# Patient Record
Sex: FEMALE | Race: WHITE | NOT HISPANIC OR LATINO | Employment: FULL TIME | URBAN - METROPOLITAN AREA
[De-identification: names, ages, dates, MRNs, and addresses within clinical notes are randomized per-mention and may not be internally consistent; named-entity substitution may affect disease eponyms.]

---

## 2017-07-28 ENCOUNTER — OFFICE VISIT (OUTPATIENT)
Dept: URGENT CARE | Facility: CLINIC | Age: 22
End: 2017-07-28
Payer: COMMERCIAL

## 2017-07-28 VITALS
SYSTOLIC BLOOD PRESSURE: 122 MMHG | DIASTOLIC BLOOD PRESSURE: 60 MMHG | RESPIRATION RATE: 16 BRPM | BODY MASS INDEX: 22.49 KG/M2 | WEIGHT: 135 LBS | OXYGEN SATURATION: 99 % | HEART RATE: 61 BPM | HEIGHT: 65 IN | TEMPERATURE: 98.9 F

## 2017-07-28 DIAGNOSIS — S61.412A LACERATION OF LEFT HAND, FOREIGN BODY PRESENCE UNSPECIFIED, INITIAL ENCOUNTER: ICD-10-CM

## 2017-07-28 PROCEDURE — 12001 RPR S/N/AX/GEN/TRNK 2.5CM/<: CPT | Performed by: PHYSICIAN ASSISTANT

## 2017-07-28 ASSESSMENT — ENCOUNTER SYMPTOMS
GASTROINTESTINAL NEGATIVE: 1
SENSORY CHANGE: 0
RESPIRATORY NEGATIVE: 1
PSYCHIATRIC NEGATIVE: 1
ROS SKIN COMMENTS: LACERATION
EYES NEGATIVE: 1
CARDIOVASCULAR NEGATIVE: 1
CONSTITUTIONAL NEGATIVE: 1
MUSCULOSKELETAL NEGATIVE: 1
TINGLING: 0

## 2017-07-28 NOTE — MR AVS SNAPSHOT
"        Suzi Gilmore   2017 5:30 PM   Office Visit   MRN: [id number]    Department:  [facility]   Dept Phone:  [phone]    Description:  Female : 1995   Provider:  Jacobo Pittman PA-C           Reason for Visit     Laceration on left hand x today      Allergies as of 2017     No Known Allergies      You were diagnosed with     Laceration of left hand, foreign body presence unspecified, initial encounter   [[id number]]         Vital Signs     Blood Pressure Pulse Temperature Respirations Height Weight    122/60 mmHg 61 37.2 °C (98.9 °F) 16 1.651 m (5' 5\") 61.236 kg (135 lb)    Body Mass Index Oxygen Saturation Breastfeeding? Smoking Status          22.47 kg/m2 99% No Never Smoker         Basic Information     Date Of Birth Sex Race Ethnicity Preferred Language    1995 Female White Non- English      Health Maintenance     Patient has no pending health maintenance at this time      Current Immunizations     No immunizations on file.      Below and/or attached are the medications your provider expects you to take. Review all of your home medications and newly ordered medications with your provider and/or pharmacist. Follow medication instructions as directed by your provider and/or pharmacist. Please keep your medication list with you and share with your provider. Update the information when medications are discontinued, doses are changed, or new medications (including over-the-counter products) are added; and carry medication information at all times in the event of emergency situations     Allergies:  No Known Allergies          Medications  Valid as of: 2017 -  6:40 PM    Generic Name Brand Name Tablet Size Instructions for use    .                 Medicines prescribed today were sent to:     [facility] [address] [place] [address] [place] - [address]    [address] [address]    Phone: [phone] Fax: [phone]    Open " 24 Hours?: Yes      Medication refill instructions:       If your prescription bottle indicates you have medication refills left, it is not necessary to call your provider’s office. Please contact your pharmacy and they will refill your medication.    If your prescription bottle indicates you do not have any refills left, you may request refills at any time through one of the following ways: The online AccessData system (except Urgent Care), by calling your provider’s office, or by asking your pharmacy to contact your provider’s office with a refill request. Medication refills are processed only during regular business hours and may not be available until the next business day. Your provider may request additional information or to have a follow-up visit with you prior to refilling your medication.   *Please Note: Medication refills are assigned a new Rx number when refilled electronically. Your pharmacy may indicate that no refills were authorized even though a new prescription for the same medication is available at the pharmacy. Please request the medicine by name with the pharmacy before contacting your provider for a refill.        Instructions    Wound Care Instructions    * After 24hrs leaving a wound uncovered helps it stay dry and heal.  If the wound isn't in an area that will get dirty or be rubbed by clothing you don't have to cover it.  If you do need to cover it do so with either an adhesive strip (band-aid) or sterile gauze and adhesive tape.  Change the dressing each day to keep the wound clean and dry.    * Antibiotic ointments such as Bacitracin or Neosporin may help keep the wound clean and help with scarring.  But DO NOT use these type of ointments with Dermabond as they interfere with wound healing.     * Keep wound dry for 5-7 days so the stitches have time to set .    * Return to the clinic in 8-10 days for stitch removal.    * You may be prescribed oral antibiotics if risk of infection is very  high (such as your wound occurred over 12hrs before closure or you are a diabetic)    * You should have a current tetanus vaccination within the past ten years.    * Call your doctor if any of the following things occur as they may be signs of infection...    **the wound opens    **the wound drains pus    **red streaks develop from near the wound    **you have a fever over 101    **the wound site becomes tender or inflamed                Adsvark Access Code: [email or web address]  Expires: 8/27/2017  6:24 PM    Your email address is not on file at [facility].  Email Addresses are required for you to sign up for Adsvark, please contact [phone] to verify your personal information and to provide your email address prior to attempting to register for Adsvark.    Suzi Gilmore  [address] [place] [address]    Adsvark  A secure, online tool to manage your health information     [facility]’s Adsvark® is a secure, online tool that connects you to your personalized health information from the privacy of your home -- day or night - making it very easy for you to manage your healthcare. Once the activation process is completed, you can even access your medical information using the Adsvark milly, which is available for free in the Apple Milly store or Google Play store.     To learn more about Adsvark, visit [email or web address]    There are two levels of access available (as shown below):   My Chart Features  [facility] Primary Care Doctor [facility]  Specialists [facility]  Urgent  Care Non-[facility] Primary Care Doctor   Email your healthcare team securely and privately 24/7 X X X    Manage appointments: schedule your next appointment; view details of past/upcoming appointments X      Request prescription refills. X      View recent personal medical records, including lab and immunizations X X X X   View health record, including health history, allergies, medications X X X X   Read reports about your outpatient  visits, procedures, consult and ER notes X X X X   See your discharge summary, which is a recap of your hospital and/or ER visit that includes your diagnosis, lab results, and care plan X X  X     How to register for Vino Volo:  Once your e-mail address has been verified, follow the following steps to sign up for Vino Volo.     1. Go to  [email or web address]  2. Click on the Sign Up Now box, which takes you to the New Member Sign Up page. You will need to provide the following information:  a. Enter your Vino Volo Access Code exactly as it appears at the top of this page. (You will not need to use this code after you’ve completed the sign-up process. If you do not sign up before the expiration date, you must request a new code.)   b. Enter your date of birth.   c. Enter your home email address.   d. Click Submit, and follow the next screen’s instructions.  3. Create a Vino Volo ID. This will be your Vino Volo login ID and cannot be changed, so think of one that is secure and easy to remember.  4. Create a [employer] Privatet password. You can change your password at any time.  5. Enter your Password Reset Question and Answer. This can be used at a later time if you forget your password.   6. Enter your e-mail address. This allows you to receive e-mail notifications when new information is available in Vino Volo.  7. Click Sign Up. You can now view your health information.    For assistance activating your Vino Volo account, call [phone]

## 2017-07-28 NOTE — Clinical Note
July 28, 2017         Patient: Suzi Gilmore   YOB: 1995   Date of Visit: 7/28/2017           To Whom it May Concern:    Suzi Gilmore was seen in my clinic on 7/28/2017. Please limit left hand use for the next 8 days.    If you have any questions or concerns, please don't hesitate to call.        Sincerely,           Jacobo Pittman PA-C  Electronically Signed

## 2017-07-29 NOTE — PROGRESS NOTES
"Subjective:      Suzi Gilmore is a 21 y.o. female who presents with Laceration            Laceration       Chief Complaint   Patient presents with   • Laceration     on left hand x today       HPI:  Suzi Gilmore is a 21 y.o female who presents with laceration to left hand. Patient is right-hand dominant.. Patient states she cut herself with a sharp pocketknife approximately 30 minutes before presentation. She is up-to-date on tetanus. No numbness or tingling. Patient denies HA, SOB, chest pain, palpitations, fever, chills, or n/v/d.      History reviewed. No pertinent past medical history.    History reviewed. No pertinent past surgical history.    History reviewed. No pertinent family history.    Social History     Social History   • Marital Status: Single     Spouse Name: N/A   • Number of Children: N/A   • Years of Education: N/A     Occupational History   • Not on file.     Social History Main Topics   • Smoking status: Never Smoker    • Smokeless tobacco: Not on file   • Alcohol Use: Not on file   • Drug Use: Yes     Special: Marijuana   • Sexual Activity: Not on file     Other Topics Concern   • Not on file     Social History Narrative   • No narrative on file       No current outpatient prescriptions on file.    No Known Allergies          Review of Systems   Constitutional: Negative.    HENT: Negative.    Eyes: Negative.    Respiratory: Negative.    Cardiovascular: Negative.    Gastrointestinal: Negative.    Genitourinary: Negative.    Musculoskeletal: Negative.    Skin:        Laceration   Neurological: Negative for tingling and sensory change.   Endo/Heme/Allergies: Negative.    Psychiatric/Behavioral: Negative.           Objective:     /60 mmHg  Pulse 61  Temp(Src) 37.2 °C (98.9 °F)  Resp 16  Ht 1.651 m (5' 5\")  Wt 61.236 kg (135 lb)  BMI 22.47 kg/m2  SpO2 99%  Breastfeeding? No     Physical Exam   Musculoskeletal:        Hands:         Nursing note and vital signs " reviewed.    Constitutional:  Appropriately groomed, pleasant affect, well nourished, and in no acute distress.    HEENT:  Head: Atraumatic, normocephalic.    Eyes:  EOMs full.  Conjunctivae clear, sclera white, and medial canthus without exudate bilaterally.    Ears:  Hearing grossly intact to voice.    Neck:  FROM.  No anterior cervical chain lymphadenopathy. Thyroid nonpalpable, without masses or nodules. No supraclavicular lymphadenopathy to palpation.    Lungs:  Lungs with normal respiratory excursion and effort.      Muscle skeletal:  Gait and station wnl, non antalgic.    Derm:  1.5 cm laceration to left hand thenar eminence dorsal aspect. Neurovascular status intact. Flexion extension mechanism intact. No involvement of deeper muscular tissue subcutaneous tissue present. Overall good turgor pressure.     Psychiatric:  Normal judgement, mood and affect.      Laceration repair:  After discussing the procedure, consent was obtained.   The site was debridded and cleaned with hexedine.  Sterile techinique was maintained throughout. Anesthesia was obtained using 1mL of 2 % Lidocaine w/epi with a 27 g needle.  The laceration was approximated using #for simple interrupted 4-0 Ethilon monofilament sutures.  A total of #for sutures were placed.  The site was dressed with Neosporin, Telfa, rolled gauze, secured with Coban.     Assessment/Plan:     1. Laceration of left hand, foreign body presence unspecified, initial encounter  Patient presents with one half centimeter laceration to left hand dorsal aspect between the first finger. Not involving tendon. Flexion and extension intact. Neurovascular status intact. #4 4-0 Ethilon sutures placed simple interrupted. Please see procedure note for further details. Patient tolerated procedure well. Up-to-date. Recommended returning in 7-8 days for suture removal.    Patient was in agreement with this treatment plan and seemed to understand without barriers. All questions were  encouraged and answered.  Reviewed signs and symptoms of when to seek emergency medical care.     Please note that this dictation was created using voice recognition software.  I have made every reasonable attempt to correct obvious errors, but I expect there are errors of jolly and possibly content that I did not discover before finalizing the note.

## 2017-07-29 NOTE — PATIENT INSTRUCTIONS
Wound Care Instructions    * After 24hrs leaving a wound uncovered helps it stay dry and heal.  If the wound isn't in an area that will get dirty or be rubbed by clothing you don't have to cover it.  If you do need to cover it do so with either an adhesive strip (band-aid) or sterile gauze and adhesive tape.  Change the dressing each day to keep the wound clean and dry.    * Antibiotic ointments such as Bacitracin or Neosporin may help keep the wound clean and help with scarring.  But DO NOT use these type of ointments with Dermabond as they interfere with wound healing.     * Keep wound dry for 5-7 days so the stitches have time to set .    * Return to the clinic in 8-10 days for stitch removal.    * You may be prescribed oral antibiotics if risk of infection is very high (such as your wound occurred over 12hrs before closure or you are a diabetic)    * You should have a current tetanus vaccination within the past ten years.    * Call your doctor if any of the following things occur as they may be signs of infection...    **the wound opens    **the wound drains pus    **red streaks develop from near the wound    **you have a fever over 101    **the wound site becomes tender or inflamed

## 2017-08-01 ENCOUNTER — OFFICE VISIT (OUTPATIENT)
Dept: URGENT CARE | Facility: CLINIC | Age: 22
End: 2017-08-01
Payer: COMMERCIAL

## 2017-08-01 VITALS
SYSTOLIC BLOOD PRESSURE: 112 MMHG | BODY MASS INDEX: 22.16 KG/M2 | OXYGEN SATURATION: 99 % | WEIGHT: 133 LBS | HEIGHT: 65 IN | HEART RATE: 50 BPM | DIASTOLIC BLOOD PRESSURE: 74 MMHG | TEMPERATURE: 97.8 F | RESPIRATION RATE: 16 BRPM

## 2017-08-01 DIAGNOSIS — J02.9 SORE THROAT: ICD-10-CM

## 2017-08-01 DIAGNOSIS — J02.0 STREP PHARYNGITIS: ICD-10-CM

## 2017-08-01 LAB
INT CON NEG: NEGATIVE
INT CON POS: POSITIVE
S PYO AG THROAT QL: POSITIVE

## 2017-08-01 PROCEDURE — 87880 STREP A ASSAY W/OPTIC: CPT | Performed by: EMERGENCY MEDICINE

## 2017-08-01 PROCEDURE — 99203 OFFICE O/P NEW LOW 30 MIN: CPT | Performed by: EMERGENCY MEDICINE

## 2017-08-01 RX ORDER — PENICILLIN V POTASSIUM 500 MG/1
500 TABLET ORAL 2 TIMES DAILY
Qty: 20 TAB | Refills: 0 | Status: SHIPPED | OUTPATIENT
Start: 2017-08-01 | End: 2017-08-11

## 2017-08-01 ASSESSMENT — ENCOUNTER SYMPTOMS
DIARRHEA: 0
COUGH: 1
TROUBLE SWALLOWING: 0
VOMITING: 0
FEVER: 1
DIAPHORESIS: 1
HEADACHES: 0
ABDOMINAL PAIN: 0
HOARSE VOICE: 0
CHILLS: 1
MYALGIAS: 0
SHORTNESS OF BREATH: 0
SWOLLEN GLANDS: 0

## 2017-08-01 NOTE — PATIENT INSTRUCTIONS
"Use over-the-counter pain reliever, such as acetaminophen (Tylenol), ibuprofen (Advil, Motrin) or naproxen (Aleve) as needed; follow package directions for dosing.  Use an oral probiotic daily, such as Culturelle, Align, or yogurt to reduce gastrointestinal symptoms.      Strep Throat  Strep throat is an infection of the throat caused by a bacteria named Streptococcus pyogenes. Your health care provider may call the infection streptococcal \"tonsillitis\" or \"pharyngitis\" depending on whether there are signs of inflammation in the tonsils or back of the throat. Strep throat is most common in children aged 5-15 years during the cold months of the year, but it can occur in people of any age during any season. This infection is spread from person to person (contagious) through coughing, sneezing, or other close contact.  SIGNS AND SYMPTOMS   · Fever or chills.  · Painful, swollen, red tonsils or throat.  · Pain or difficulty when swallowing.  · White or yellow spots on the tonsils or throat.  · Swollen, tender lymph nodes or \"glands\" of the neck or under the jaw.  · Red rash all over the body (rare).  DIAGNOSIS   Many different infections can cause the same symptoms. A test must be done to confirm the diagnosis so the right treatment can be given. A \"rapid strep test\" can help your health care provider make the diagnosis in a few minutes. If this test is not available, a light swab of the infected area can be used for a throat culture test. If a throat culture test is done, results are usually available in a day or two.  TREATMENT   Strep throat is treated with antibiotic medicine.  HOME CARE INSTRUCTIONS   · Gargle with 1 tsp of salt in 1 cup of warm water, 3-4 times per day or as needed for comfort.  · Family members who also have a sore throat or fever should be tested for strep throat and treated with antibiotics if they have the strep infection.  · Make sure everyone in your household washes their hands well.  · Do " not share food, drinking cups, or personal items that could cause the infection to spread to others.  · You may need to eat a soft food diet until your sore throat gets better.  · Drink enough water and fluids to keep your urine clear or pale yellow. This will help prevent dehydration.  · Get plenty of rest.  · Stay home from school, day care, or work until you have been on antibiotics for 24 hours.  · Take medicines only as directed by your health care provider.  · Take your antibiotic medicine as directed by your health care provider. Finish it even if you start to feel better.  SEEK MEDICAL CARE IF:   · The glands in your neck continue to enlarge.  · You develop a rash, cough, or earache.  · You cough up green, yellow-brown, or bloody sputum.  · You have pain or discomfort not controlled by medicines.  · Your problems seem to be getting worse rather than better.  · You have a fever.  SEEK IMMEDIATE MEDICAL CARE IF:   · You develop any new symptoms such as vomiting, severe headache, stiff or painful neck, chest pain, shortness of breath, or trouble swallowing.  · You develop severe throat pain, drooling, or changes in your voice.  · You develop swelling of the neck, or the skin on the neck becomes red and tender.  · You develop signs of dehydration, such as fatigue, dry mouth, and decreased urination.  · You become increasingly sleepy, or you cannot wake up completely.  MAKE SURE YOU:  · Understand these instructions.  · Will watch your condition.  · Will get help right away if you are not doing well or get worse.     This information is not intended to replace advice given to you by your health care provider. Make sure you discuss any questions you have with your health care provider.     Document Released: 12/15/2001 Document Revised: 01/08/2016 Document Reviewed: 04/11/2016  [employer] Interactive Patient Education ©2016 [employer]

## 2017-08-01 NOTE — PROGRESS NOTES
"Subjective:      Suzi Gilmore is a 21 y.o. female who presents with URI            Pharyngitis   This is a new problem. Episode onset: 3 days. The problem has been gradually worsening. Neither side of throat is experiencing more pain than the other. Maximum temperature: subjective fever. The pain is moderate. Associated symptoms include congestion and coughing. Pertinent negatives include no abdominal pain, diarrhea, drooling, ear discharge, ear pain, headaches, hoarse voice, plugged ear sensation, shortness of breath, swollen glands, trouble swallowing or vomiting. She has tried cool liquids and NSAIDs for the symptoms. The treatment provided mild relief.    states boyfriend with similar symptoms, no testing or treatment. Past medical history significant for allergic rhinitis without recent exacerbation. Notes left hand laceration from a few days ago without problems.  Notes ongoing anxiety illness issues; has plan for F/U PCP.  States accidental injury to the right orbit area associated with anxiety; denies intentional self injury, denies assault.  Review of Systems   Constitutional: Positive for fever, chills and diaphoresis.   HENT: Positive for congestion. Negative for drooling, ear discharge, ear pain, hoarse voice and trouble swallowing.    Respiratory: Positive for cough. Negative for shortness of breath.    Gastrointestinal: Negative for vomiting, abdominal pain and diarrhea.   Musculoskeletal: Negative for myalgias.   Neurological: Negative for headaches.   Endo/Heme/Allergies: Positive for environmental allergies.          Objective:     /74 mmHg  Pulse 50  Temp(Src) 36.6 °C (97.8 °F)  Resp 16  Ht 1.651 m (5' 5\")  Wt 60.328 kg (133 lb)  BMI 22.13 kg/m2  SpO2 99%     Physical Exam   Constitutional: She appears well-developed and well-nourished. She is cooperative.  Non-toxic appearance. She does not appear ill. No distress.   HENT:   Head: Normocephalic. Head is with contusion. "       Right Ear: Tympanic membrane and ear canal normal.   Left Ear: Tympanic membrane and ear canal normal.   Nose: Mucosal edema present. No rhinorrhea.   Mouth/Throat: Uvula is midline. No trismus in the jaw. No uvula swelling. Oropharyngeal exudate and posterior oropharyngeal erythema present. No posterior oropharyngeal edema.   Eyes: Conjunctivae are normal.   Neck: Trachea normal. Neck supple.   Cardiovascular: Normal rate, regular rhythm and normal heart sounds.    Not bradycardic.   Pulmonary/Chest: Effort normal and breath sounds normal.   Lymphadenopathy:     She has cervical adenopathy.        Right cervical: Superficial cervical adenopathy present. No posterior cervical adenopathy present.       Left cervical: Superficial cervical adenopathy present. No posterior cervical adenopathy present.   Neurological: She is alert.   Skin: Skin is warm and dry. No rash noted.   Psychiatric: She has a normal mood and affect. Her speech is normal and behavior is normal.          Advised need for recheck if symptoms not resolving in the next 2-3 days.     Assessment/Plan:     1. Strep pharyngitis  Rx Pen  mg bid x 10 days  Recommended supportive care measures, including rest, increasing oral fluid intake and use of over-the-counter medications for relief of symptoms.    2. Sore throat  Positive- POCT Rapid Strep A

## 2017-08-01 NOTE — MR AVS SNAPSHOT
"        Suzi Gilmore   2017 12:30 PM   Office Visit   MRN: [id number]    Department:  [facility]   Dept Phone:  [phone]    Description:  Female : 1995   Provider:  Devin Kaiser M.D.           Reason for Visit     URI x 2 days       Allergies as of 2017     No Known Allergies      You were diagnosed with     Strep pharyngitis   [500518]       Sore throat   [880435]         Vital Signs     Blood Pressure Pulse Temperature Respirations Height Weight    112/74 mmHg 50 36.6 °C (97.8 °F) 16 1.651 m (5' 5\") 60.328 kg (133 lb)    Body Mass Index Oxygen Saturation Smoking Status             22.13 kg/m2 99% Never Smoker          Basic Information     Date Of Birth Sex Race Ethnicity Preferred Language    1995 Female White Non- English      Health Maintenance        Date Due Completion Dates    IMM HEP B VACCINE (1 of 3 - Primary Series) 1995 ---    IMM HEP A VACCINE (1 of 2 - Standard Series) 10/2/1998 ---    IMM HPV VACCINE (1 of 3 - Female 3 Dose Series) 10/2/2008 ---    IMM VARICELLA (CHICKENPOX) VACCINE (1 of 2 - 2 Dose Adolescent Series) 10/2/2010 ---    IMM MENINGOCOCCAL VACCINE (MCV4) (1 of 1) 10/2/2013 ---    IMM DTaP/Tdap/Td Vaccine (1 - Tdap) 10/2/2016 ---    IMM INFLUENZA (1) 2017 ---            Current Immunizations     No immunizations on file.      Below and/or attached are the medications your provider expects you to take. Review all of your home medications and newly ordered medications with your provider and/or pharmacist. Follow medication instructions as directed by your provider and/or pharmacist. Please keep your medication list with you and share with your provider. Update the information when medications are discontinued, doses are changed, or new medications (including over-the-counter products) are added; and carry medication information at all times in the event of emergency situations     Allergies:  No Known Allergies          Medications  " Valid as of: August 01, 2017 -  1:09 PM    Generic Name Brand Name Tablet Size Instructions for use    Penicillin V Potassium (Tab) VEETID 500 MG Take 1 Tab by mouth 2 Times a Day for 10 days.        .                 Medicines prescribed today were sent to:     [facility] [id number] [place] [address], [place] - [address] [place]    [address] [place] [address]    Phone: [phone] Fax: [phone]    Open 24 Hours?: Yes      Medication refill instructions:       If your prescription bottle indicates you have medication refills left, it is not necessary to call your provider’s office. Please contact your pharmacy and they will refill your medication.    If your prescription bottle indicates you do not have any refills left, you may request refills at any time through one of the following ways: The online Offees system (except Urgent Care), by calling your provider’s office, or by asking your pharmacy to contact your provider’s office with a refill request. Medication refills are processed only during regular business hours and may not be available until the next business day. Your provider may request additional information or to have a follow-up visit with you prior to refilling your medication.   *Please Note: Medication refills are assigned a new Rx number when refilled electronically. Your pharmacy may indicate that no refills were authorized even though a new prescription for the same medication is available at the pharmacy. Please request the medicine by name with the pharmacy before contacting your provider for a refill.        Instructions    Use over-the-counter pain reliever, such as acetaminophen (Tylenol), ibuprofen (Advil, Motrin) or naproxen (Aleve) as needed; follow package directions for dosing.  Use an oral probiotic daily, such as Culturelle, Align, or yogurt to reduce gastrointestinal symptoms.      Strep Throat  Strep throat is an infection of the throat caused by a  "bacteria named Streptococcus pyogenes. Your health care provider may call the infection streptococcal \"tonsillitis\" or \"pharyngitis\" depending on whether there are signs of inflammation in the tonsils or back of the throat. Strep throat is most common in children aged 5-15 years during the cold months of the year, but it can occur in people of any age during any season. This infection is spread from person to person (contagious) through coughing, sneezing, or other close contact.  SIGNS AND SYMPTOMS   · Fever or chills.  · Painful, swollen, red tonsils or throat.  · Pain or difficulty when swallowing.  · White or yellow spots on the tonsils or throat.  · Swollen, tender lymph nodes or \"glands\" of the neck or under the jaw.  · Red rash all over the body (rare).  DIAGNOSIS   Many different infections can cause the same symptoms. A test must be done to confirm the diagnosis so the right treatment can be given. A \"rapid strep test\" can help your health care provider make the diagnosis in a few minutes. If this test is not available, a light swab of the infected area can be used for a throat culture test. If a throat culture test is done, results are usually available in a day or two.  TREATMENT   Strep throat is treated with antibiotic medicine.  HOME CARE INSTRUCTIONS   · Gargle with 1 tsp of salt in 1 cup of warm water, 3-4 times per day or as needed for comfort.  · Family members who also have a sore throat or fever should be tested for strep throat and treated with antibiotics if they have the strep infection.  · Make sure everyone in your household washes their hands well.  · Do not share food, drinking cups, or personal items that could cause the infection to spread to others.  · You may need to eat a soft food diet until your sore throat gets better.  · Drink enough water and fluids to keep your urine clear or pale yellow. This will help prevent dehydration.  · Get plenty of rest.  · Stay home from school, day " care, or work until you have been on antibiotics for 24 hours.  · Take medicines only as directed by your health care provider.  · Take your antibiotic medicine as directed by your health care provider. Finish it even if you start to feel better.  SEEK MEDICAL CARE IF:   · The glands in your neck continue to enlarge.  · You develop a rash, cough, or earache.  · You cough up green, yellow-brown, or bloody sputum.  · You have pain or discomfort not controlled by medicines.  · Your problems seem to be getting worse rather than better.  · You have a fever.  SEEK IMMEDIATE MEDICAL CARE IF:   · You develop any new symptoms such as vomiting, severe headache, stiff or painful neck, chest pain, shortness of breath, or trouble swallowing.  · You develop severe throat pain, drooling, or changes in your voice.  · You develop swelling of the neck, or the skin on the neck becomes red and tender.  · You develop signs of dehydration, such as fatigue, dry mouth, and decreased urination.  · You become increasingly sleepy, or you cannot wake up completely.  MAKE SURE YOU:  · Understand these instructions.  · Will watch your condition.  · Will get help right away if you are not doing well or get worse.     This information is not intended to replace advice given to you by your health care provider. Make sure you discuss any questions you have with your health care provider.     Document Released: 12/15/2001 Document Revised: 01/08/2016 Document Reviewed: 04/11/2016  [employer] - A First of Its Kind Flip Flop Interactive Patient Education ©2016 Elsevier Inc.            [facility] Access Code: [email or web address]  Expires: 8/27/2017  6:24 PM    Your email address is not on file at [facility].  Email Addresses are required for you to sign up for [facility], please contact [phone] to verify your personal information and to provide your email address prior to attempting to register for [facility].    Suzi Gilmore  [address]  [place] [address]    [place]  A  secure, online tool to manage your health information     [employer]’s Quantum® is a secure, online tool that connects you to your personalized health information from the privacy of your home -- day or night - making it very easy for you to manage your healthcare. Once the activation process is completed, you can even access your medical information using the SynergEyest milly, which is available for free in the Apple Milly store or Google Play store.     To learn more about Quantum, visit [email or web address]    There are two levels of access available (as shown below):   My Chart Features  [facility] Primary Care Doctor [facility]  Specialists [facility]  Urgent  Care Non-[facility] Primary Care Doctor   Email your healthcare team securely and privately 24/7 X X X    Manage appointments: schedule your next appointment; view details of past/upcoming appointments X      Request prescription refills. X      View recent personal medical records, including lab and immunizations X X X X   View health record, including health history, allergies, medications X X X X   Read reports about your outpatient visits, procedures, consult and ER notes X X X X   See your discharge summary, which is a recap of your hospital and/or ER visit that includes your diagnosis, lab results, and care plan X X  X     How to register for Quantum:  Once your e-mail address has been verified, follow the following steps to sign up for Quantum.     1. Go to  [email or web address]  2. Click on the Sign Up Now box, which takes you to the New Member Sign Up page. You will need to provide the following information:  a. Enter your Quantum Access Code exactly as it appears at the top of this page. (You will not need to use this code after you’ve completed the sign-up process. If you do not sign up before the expiration date, you must request a new code.)   b. Enter your date of birth.   c. Enter your home email address.   d. Click Submit, and follow the next screen’s  instructions.  3. Create a LikeAndyt ID. This will be your LikeAndyt login ID and cannot be changed, so think of one that is secure and easy to remember.  4. Create a [employer] password. You can change your password at any time.  5. Enter your Password Reset Question and Answer. This can be used at a later time if you forget your password.   6. Enter your e-mail address. This allows you to receive e-mail notifications when new information is available in [employer].  7. Click Sign Up. You can now view your health information.    For assistance activating your [employer] account, call [phone]

## 2017-10-30 ENCOUNTER — OFFICE VISIT (OUTPATIENT)
Dept: URGENT CARE | Facility: CLINIC | Age: 22
End: 2017-10-30
Payer: COMMERCIAL

## 2017-10-30 VITALS
DIASTOLIC BLOOD PRESSURE: 70 MMHG | BODY MASS INDEX: 20.49 KG/M2 | OXYGEN SATURATION: 100 % | HEART RATE: 50 BPM | SYSTOLIC BLOOD PRESSURE: 112 MMHG | HEIGHT: 65 IN | WEIGHT: 123 LBS | RESPIRATION RATE: 16 BRPM | TEMPERATURE: 97.9 F

## 2017-10-30 DIAGNOSIS — F41.9 ANXIETY AND DEPRESSION: ICD-10-CM

## 2017-10-30 DIAGNOSIS — F41.0 PANIC DISORDER: ICD-10-CM

## 2017-10-30 DIAGNOSIS — F32.A ANXIETY AND DEPRESSION: ICD-10-CM

## 2017-10-30 PROCEDURE — 99214 OFFICE O/P EST MOD 30 MIN: CPT | Performed by: EMERGENCY MEDICINE

## 2017-10-30 RX ORDER — ALPRAZOLAM 1 MG/1
1 TABLET ORAL 3 TIMES DAILY PRN
Qty: 12 TAB | Refills: 0 | Status: SHIPPED | OUTPATIENT
Start: 2017-10-30

## 2017-10-30 ASSESSMENT — ENCOUNTER SYMPTOMS
ANOREXIA: 1
VOMITING: 1
FEVER: 0
DIZZINESS: 1
PALPITATIONS: 0
COUGH: 0
HEADACHES: 0
DEPRESSION: 1
SEIZURES: 0
NAUSEA: 1
SPEECH CHANGE: 0
ABDOMINAL PAIN: 0
FOCAL WEAKNESS: 0
HALLUCINATIONS: 0
DIARRHEA: 0
NERVOUS/ANXIOUS: 1
INSOMNIA: 1
SHORTNESS OF BREATH: 1
SENSORY CHANGE: 0

## 2017-10-30 ASSESSMENT — LIFESTYLE VARIABLES: SUBSTANCE_ABUSE: 0
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
## 2017-10-30 NOTE — PATIENT INSTRUCTIONS
You should contact a primary care provider for follow-up as soon as available.  Go to the nearest hospital Emergency Department, or call 911, if any worsening condition.    Panic Attacks  Panic attacks are sudden, short-lived surges of severe anxiety, fear, or discomfort. They may occur for no reason when you are relaxed, when you are anxious, or when you are sleeping. Panic attacks may occur for a number of reasons:   · Healthy people occasionally have panic attacks in extreme, life-threatening situations, such as war or natural disasters. Normal anxiety is a protective mechanism of the body that helps us react to danger (fight or flight response).  · Panic attacks are often seen with anxiety disorders, such as panic disorder, social anxiety disorder, generalized anxiety disorder, and phobias. Anxiety disorders cause excessive or uncontrollable anxiety. They may interfere with your relationships or other life activities.  · Panic attacks are sometimes seen with other mental illnesses, such as depression and posttraumatic stress disorder.  · Certain medical conditions, prescription medicines, and drugs of abuse can cause panic attacks.  SYMPTOMS   Panic attacks start suddenly, peak within 20 minutes, and are accompanied by four or more of the following symptoms:  · Pounding heart or fast heart rate (palpitations).  · Sweating.  · Trembling or shaking.  · Shortness of breath or feeling smothered.  · Feeling choked.  · Chest pain or discomfort.  · Nausea or strange feeling in your stomach.  · Dizziness, light-headedness, or feeling like you will faint.  · Chills or hot flushes.  · Numbness or tingling in your lips or hands and feet.  · Feeling that things are not real or feeling that you are not yourself.  · Fear of losing control or going crazy.  · Fear of dying.  Some of these symptoms can mimic serious medical conditions. For example, you may think you are having a heart attack. Although panic attacks can be very  scary, they are not life threatening.  DIAGNOSIS   Panic attacks are diagnosed through an assessment by your health care provider. Your health care provider will ask questions about your symptoms, such as where and when they occurred. Your health care provider will also ask about your medical history and use of alcohol and drugs, including prescription medicines. Your health care provider may order blood tests or other studies to rule out a serious medical condition. Your health care provider may refer you to a mental health professional for further evaluation.  TREATMENT   · Most healthy people who have one or two panic attacks in an extreme, life-threatening situation will not require treatment.  · The treatment for panic attacks associated with anxiety disorders or other mental illness typically involves counseling with a mental health professional, medicine, or a combination of both. Your health care provider will help determine what treatment is best for you.  · Panic attacks due to physical illness usually go away with treatment of the illness. If prescription medicine is causing panic attacks, talk with your health care provider about stopping the medicine, decreasing the dose, or substituting another medicine.  · Panic attacks due to alcohol or drug abuse go away with abstinence. Some adults need professional help in order to stop drinking or using drugs.  HOME CARE INSTRUCTIONS   · Take all medicines as directed by your health care provider.    · Schedule and attend follow-up visits as directed by your health care provider. It is important to keep all your appointments.  SEEK MEDICAL CARE IF:  · You are not able to take your medicines as prescribed.  · Your symptoms do not improve or get worse.  SEEK IMMEDIATE MEDICAL CARE IF:   · You experience panic attack symptoms that are different than your usual symptoms.  · You have serious thoughts about hurting yourself or others.  · You are taking medicine for  panic attacks and have a serious side effect.  MAKE SURE YOU:  · Understand these instructions.  · Will watch your condition.  · Will get help right away if you are not doing well or get worse.     This information is not intended to replace advice given to you by your health care provider. Make sure you discuss any questions you have with your health care provider.     Document Released: 12/18/2006 Document Revised: 12/23/2014 Document Reviewed: 08/01/2014  [employer] Interactive Patient Education ©2016 Elsevier Inc.  Generalized Anxiety Disorder  Generalized anxiety disorder (HITESH) is a mental disorder. It interferes with life functions, including relationships, work, and school.  HITESH is different from normal anxiety, which everyone experiences at some point in their lives in response to specific life events and activities. Normal anxiety actually helps us prepare for and get through these life events and activities. Normal anxiety goes away after the event or activity is over.   HITESH causes anxiety that is not necessarily related to specific events or activities. It also causes excess anxiety in proportion to specific events or activities. The anxiety associated with HITESH is also difficult to control. HITESH can vary from mild to severe. People with severe HITESH can have intense waves of anxiety with physical symptoms (panic attacks).   SYMPTOMS  The anxiety and worry associated with HITESH are difficult to control. This anxiety and worry are related to many life events and activities and also occur more days than not for 6 months or longer. People with HITESH also have three or more of the following symptoms (one or more in children):  · Restlessness.    · Fatigue.  · Difficulty concentrating.    · Irritability.  · Muscle tension.  · Difficulty sleeping or unsatisfying sleep.  DIAGNOSIS  HITESH is diagnosed through an assessment by your health care provider. Your health care provider will ask you questions about your  mood, physical symptoms, and events in your life. Your health care provider may ask you about your medical history and use of alcohol or drugs, including prescription medicines. Your health care provider may also do a physical exam and blood tests. Certain medical conditions and the use of certain substances can cause symptoms similar to those associated with HITESH. Your health care provider may refer you to a mental health specialist for further evaluation.  TREATMENT  The following therapies are usually used to treat HITESH:   · Medication. Antidepressant medication usually is prescribed for long-term daily control. Antianxiety medicines may be added in severe cases, especially when panic attacks occur.    · Talk therapy (psychotherapy). Certain types of talk therapy can be helpful in treating HITESH by providing support, education, and guidance. A form of talk therapy called cognitive behavioral therapy can teach you healthy ways to think about and react to daily life events and activities.  · Stress management techniques. These include yoga, meditation, and exercise and can be very helpful when they are practiced regularly.  A mental health specialist can help determine which treatment is best for you. Some people see improvement with one therapy. However, other people require a combination of therapies.     This information is not intended to replace advice given to you by your health care provider. Make sure you discuss any questions you have with your health care provider.     Document Released: 04/14/2014 Document Revised: 01/08/2016 Document Reviewed: 04/14/2014  [employer] Interactive Patient Education ©2016 [employer]